# Patient Record
Sex: FEMALE | Race: BLACK OR AFRICAN AMERICAN | NOT HISPANIC OR LATINO | URBAN - METROPOLITAN AREA
[De-identification: names, ages, dates, MRNs, and addresses within clinical notes are randomized per-mention and may not be internally consistent; named-entity substitution may affect disease eponyms.]

---

## 2024-08-12 PROBLEM — R55 SYNCOPE AND COLLAPSE: Status: ACTIVE | Noted: 2024-08-12

## 2024-08-12 PROBLEM — E87.6 HYPOKALEMIA: Status: ACTIVE | Noted: 2024-08-12

## 2024-08-12 PROBLEM — R00.2 PALPITATIONS: Status: ACTIVE | Noted: 2024-08-12

## 2024-08-12 PROBLEM — I69.30 SEQUELAE OF CEREBRAL INFARCTION: Status: ACTIVE | Noted: 2024-08-12

## 2024-08-12 PROBLEM — I69.354 HEMIPLEGIA AND HEMIPARESIS FOLLOWING CEREBRAL INFARCTION AFFECTING LEFT NON-DOMINANT SIDE (MULTI): Status: ACTIVE | Noted: 2024-08-12

## 2024-08-12 PROBLEM — G47.00 INSOMNIA, UNSPECIFIED: Status: ACTIVE | Noted: 2024-08-12

## 2024-08-12 PROBLEM — G72.9 MYOPATHY, UNSPECIFIED: Status: ACTIVE | Noted: 2024-08-12

## 2024-08-12 PROBLEM — R56.9 CONVULSIONS (MULTI): Status: ACTIVE | Noted: 2024-08-12

## 2024-08-12 PROBLEM — G81.12: Status: ACTIVE | Noted: 2024-08-12

## 2024-08-12 PROBLEM — E87.1 HYPO-OSMOLALITY AND HYPONATREMIA: Status: ACTIVE | Noted: 2024-08-12

## 2024-08-12 PROBLEM — F32.A DEPRESSION: Status: ACTIVE | Noted: 2024-08-12

## 2024-08-12 PROBLEM — G40.909 EPILEPSY (MULTI): Status: ACTIVE | Noted: 2024-08-12

## 2024-08-12 PROBLEM — M21.372 FOOT DROP, LEFT FOOT: Status: ACTIVE | Noted: 2024-08-12

## 2024-08-12 PROBLEM — Z74.1 NEED FOR ASSISTANCE WITH PERSONAL CARE: Status: ACTIVE | Noted: 2024-08-12

## 2024-08-12 PROBLEM — I95.1 ORTHOSTATIC HYPOTENSION: Status: ACTIVE | Noted: 2024-08-12

## 2024-08-12 PROBLEM — R07.9 CHEST PAIN, UNSPECIFIED: Status: ACTIVE | Noted: 2024-08-12

## 2024-08-12 PROBLEM — F41.9 ANXIETY DISORDER: Status: ACTIVE | Noted: 2024-08-12

## 2024-08-12 PROBLEM — R41.82 ALTERED MENTAL STATUS: Status: ACTIVE | Noted: 2024-08-12

## 2024-08-12 PROBLEM — F43.21 ADJUSTMENT DISORDER WITH DEPRESSED MOOD: Status: ACTIVE | Noted: 2024-08-12

## 2024-08-12 PROBLEM — E66.01 MORBID (SEVERE) OBESITY DUE TO EXCESS CALORIES (MULTI): Status: ACTIVE | Noted: 2024-08-12

## 2024-08-12 PROBLEM — R26.2 DIFFICULTY WALKING: Status: ACTIVE | Noted: 2024-08-12

## 2024-08-12 PROBLEM — G93.40 ENCEPHALOPATHY: Status: ACTIVE | Noted: 2024-08-12

## 2024-08-12 PROBLEM — M62.50 MUSCULAR WASTING AND DISUSE ATROPHY: Status: ACTIVE | Noted: 2024-08-12

## 2024-08-12 PROBLEM — R26.9 ABNORMALITY OF GAIT AND MOBILITY: Status: ACTIVE | Noted: 2024-08-12

## 2024-08-12 PROBLEM — K85.90 ACUTE PANCREATITIS WITHOUT INFECTION OR NECROSIS (HHS-HCC): Status: ACTIVE | Noted: 2024-08-12

## 2024-08-12 PROBLEM — G62.9 POLYNEUROPATHY: Status: ACTIVE | Noted: 2024-08-12

## 2024-08-12 PROBLEM — E78.5 HYPERLIPIDEMIA: Status: ACTIVE | Noted: 2024-08-12

## 2024-08-12 PROBLEM — E11.43 DIABETIC AUTONOMIC NEUROPATHY ASSOCIATED WITH TYPE 2 DIABETES MELLITUS (MULTI): Status: ACTIVE | Noted: 2024-08-12

## 2024-08-12 PROBLEM — K82.9 DISEASE OF GALLBLADDER: Status: ACTIVE | Noted: 2024-08-12

## 2024-08-12 PROBLEM — E11.9 TYPE 2 DIABETES MELLITUS WITHOUT COMPLICATION (MULTI): Status: ACTIVE | Noted: 2024-08-12

## 2024-08-12 PROBLEM — Z86.73 PERSONAL HISTORY OF TRANSIENT ISCHEMIC ATTACK (TIA), AND CEREBRAL INFARCTION WITHOUT RESIDUAL DEFICITS: Status: ACTIVE | Noted: 2024-08-12

## 2024-08-12 PROBLEM — E11.620: Status: ACTIVE | Noted: 2024-08-12

## 2024-08-12 PROBLEM — I10 HYPERTENSION, ESSENTIAL: Status: ACTIVE | Noted: 2024-08-12

## 2024-12-10 ENCOUNTER — NURSING HOME VISIT (OUTPATIENT)
Facility: EXTERNAL LOCATION | Age: 44
End: 2024-12-10
Payer: MEDICAID

## 2024-12-10 DIAGNOSIS — E11.43 DIABETIC AUTONOMIC NEUROPATHY ASSOCIATED WITH TYPE 2 DIABETES MELLITUS (MULTI): ICD-10-CM

## 2024-12-10 DIAGNOSIS — E66.01 MORBID (SEVERE) OBESITY DUE TO EXCESS CALORIES (MULTI): ICD-10-CM

## 2024-12-10 DIAGNOSIS — E78.2 MIXED HYPERLIPIDEMIA: Primary | ICD-10-CM

## 2024-12-10 DIAGNOSIS — I10 HYPERTENSION, ESSENTIAL: ICD-10-CM

## 2024-12-10 PROCEDURE — 99305 1ST NF CARE MODERATE MDM 35: CPT | Performed by: INTERNAL MEDICINE

## 2024-12-10 NOTE — LETTER
Patient: Amanda Hooper  : 1980    Encounter Date: 12/10/2024    Name: Amanda Hooper  YOB: 1980    INITIAL VISIT: SNF,     [facility]   Patient seen and examined.  Nursing facility.  She was sitting at her bedside  Getting ready to get up and get around needing to go to the restroom  She does have some discomfort of her left lower extremity also has some swelling there  Otherwise states she is doing pretty well and has no other major complaints at this time  REVIEW OF SYSTEMS:  Review of systems are negative except where noted in HPI.    There were no vitals taken for this visit.   Vitals reviewed.  Blood pressure about 140/80  Physical Exam  Constitutional:       Appearance: Normal appearance. She is obese.   HENT:      Head: Normocephalic and atraumatic.      Right Ear: External ear normal.      Left Ear: External ear normal.      Nose: Nose normal.      Mouth/Throat:      Mouth: Mucous membranes are moist.      Pharynx: Oropharynx is clear.   Eyes:      Extraocular Movements: Extraocular movements intact.      Conjunctiva/sclera: Conjunctivae normal.      Pupils: Pupils are equal, round, and reactive to light.   Cardiovascular:      Rate and Rhythm: Normal rate and regular rhythm.   Pulmonary:      Effort: Pulmonary effort is normal.      Breath sounds: Normal breath sounds.   Abdominal:      General: Abdomen is flat.      Palpations: Abdomen is soft.   Musculoskeletal:         General: Swelling present.      Left lower leg: Edema present.      Comments: R AKA   Skin:     General: Skin is warm and dry.   Neurological:      General: No focal deficit present.      Mental Status: She is alert and oriented to person, place, and time.   Psychiatric:         Mood and Affect: Mood normal.         Behavior: Behavior normal.            CODE STATUS: Full code      No visits with results within 1 Week(s) from this visit.   Latest known visit with results is:   No results found for any previous visit.        LABORATORIES OR DIAGNOSTIC TESTS DONE/REVIEWED IN FACILITY:    Not on File      MEDICATIONS RECONCILED AT THE FACILITY:  Please see Nursing facility Medication EMR for full updated list.    Assessment/Plan   Problem List Items Addressed This Visit       Morbid (severe) obesity due to excess calories (Multi)    Diabetic autonomic neuropathy associated with type 2 diabetes mellitus (Multi)    Hyperlipidemia - Primary    Hypertension, essential   Plan:  At this time medically she seems fairly stable  Medications are reviewed  Vitals are reviewed and are good at this time  She is a full code  We will monitor her volume status  Continue rest of medical management as she is on        Zack Bucio DO       Electronically Signed By: Zack Bucio DO   12/10/24  9:34 AM

## 2024-12-10 NOTE — PROGRESS NOTES
Name: Amanda Hooper  YOB: 1980    INITIAL VISIT: SN[facility]   Patient seen and examined.  Nursing facility.  She was sitting at her bedside  Getting ready to get up and get around needing to go to the restroom  She does have some discomfort of her left lower extremity also has some swelling there  Otherwise states she is doing pretty well and has no other major complaints at this time  REVIEW OF SYSTEMS:  Review of systems are negative except where noted in HPI.    There were no vitals taken for this visit.   Vitals reviewed.  Blood pressure about 140/80  Physical Exam  Constitutional:       Appearance: Normal appearance. She is obese.   HENT:      Head: Normocephalic and atraumatic.      Right Ear: External ear normal.      Left Ear: External ear normal.      Nose: Nose normal.      Mouth/Throat:      Mouth: Mucous membranes are moist.      Pharynx: Oropharynx is clear.   Eyes:      Extraocular Movements: Extraocular movements intact.      Conjunctiva/sclera: Conjunctivae normal.      Pupils: Pupils are equal, round, and reactive to light.   Cardiovascular:      Rate and Rhythm: Normal rate and regular rhythm.   Pulmonary:      Effort: Pulmonary effort is normal.      Breath sounds: Normal breath sounds.   Abdominal:      General: Abdomen is flat.      Palpations: Abdomen is soft.   Musculoskeletal:         General: Swelling present.      Left lower leg: Edema present.      Comments: R AKA   Skin:     General: Skin is warm and dry.   Neurological:      General: No focal deficit present.      Mental Status: She is alert and oriented to person, place, and time.   Psychiatric:         Mood and Affect: Mood normal.         Behavior: Behavior normal.            CODE STATUS: Full code      No visits with results within 1 Week(s) from this visit.   Latest known visit with results is:   No results found for any previous visit.       LABORATORIES OR DIAGNOSTIC TESTS DONE/REVIEWED IN FACILITY:    Not on  File      MEDICATIONS RECONCILED AT THE FACILITY:  Please see Nursing facility Medication EMR for full updated list.    Assessment/Plan    Problem List Items Addressed This Visit       Morbid (severe) obesity due to excess calories (Multi)    Diabetic autonomic neuropathy associated with type 2 diabetes mellitus (Multi)    Hyperlipidemia - Primary    Hypertension, essential   Plan:  At this time medically she seems fairly stable  Medications are reviewed  Vitals are reviewed and are good at this time  She is a full code  We will monitor her volume status  Continue rest of medical management as she is on        Zack Bucio, DO